# Patient Record
Sex: FEMALE
[De-identification: names, ages, dates, MRNs, and addresses within clinical notes are randomized per-mention and may not be internally consistent; named-entity substitution may affect disease eponyms.]

---

## 2023-03-22 ENCOUNTER — NURSE TRIAGE (OUTPATIENT)
Dept: OTHER | Facility: CLINIC | Age: 63
End: 2023-03-22

## 2023-03-22 NOTE — TELEPHONE ENCOUNTER
Location of patient: [place]    Subjective: Caller states \"right knee injury\"     Current Symptoms: right knee injury fell while cleaning slipped on floor now only hurts with movement swelling is getting better no numbness or tingling, no medical hx states is getting better but still sl painful    Onset: 4 days    Associated Symptoms: NA    Pain Severity: 6/10    Temperature: none       What has been tried: ice rested it     LMP: NA Pregnant: NA    Recommended disposition: See in Office Today or Tomorrow    Care advice provided, patient verbalizes understanding; denies any other questions or concerns; instructed to call back for any new or worsening symptoms. Patient/caller agrees to follow-up with PCP     This triage is a result of a call to [address] [place] [address]. Please do not respond to the triage nurse through this encounter. Any subsequent communication should be directly with the patient.       Reason for Disposition   High-risk adult (e.g., age > 61 years, osteoporosis, chronic steroid use)    Protocols used: Knee Injury-ADULT-[place]

## 2023-08-22 ENCOUNTER — NURSE TRIAGE (OUTPATIENT)
Dept: OTHER | Facility: CLINIC | Age: 63
End: 2023-08-22

## 2023-08-22 NOTE — TELEPHONE ENCOUNTER
Location of patient: [place]    Subjective: Caller states \"I was in contact with someone last Friday who tested positive for COVID\"     Current Symptoms: None    Associated Symptoms: irritability     Recommended disposition: [facility] advice provided, patient verbalizes understanding; denies any other questions or concerns; instructed to call back for any new or worsening symptoms. Caller agrees to purchase an at-home COVID test or get tested at a clinic. This triage is a result of a call to [address] [place] [address]. Please do not respond to the triage nurse through this encounter. Any subsequent communication should be directly with the patient.       Reason for Disposition   [1] COVID-19 EXPOSURE within last 14 days AND [2] NO symptoms    Protocols used: Coronavirus (COVID-19) Exposure-ADULT-[facility]

## 2024-01-17 ENCOUNTER — OFFICE VISIT (OUTPATIENT)
Dept: OTOLARYNGOLOGY | Facility: CLINIC | Age: 64
End: 2024-01-17
Payer: COMMERCIAL

## 2024-01-17 VITALS — WEIGHT: 237 LBS

## 2024-01-17 DIAGNOSIS — J30.9 ALLERGIC RHINITIS, UNSPECIFIED SEASONALITY, UNSPECIFIED TRIGGER: Primary | ICD-10-CM

## 2024-01-17 DIAGNOSIS — H93.19 TINNITUS, UNSPECIFIED LATERALITY: ICD-10-CM

## 2024-01-17 PROCEDURE — 99203 OFFICE O/P NEW LOW 30 MIN: CPT | Performed by: GENERAL PRACTICE

## 2024-01-17 PROCEDURE — 1036F TOBACCO NON-USER: CPT | Performed by: GENERAL PRACTICE

## 2024-01-17 RX ORDER — CHOLECALCIFEROL (VITAMIN D3) 25 MCG
TABLET ORAL DAILY
COMMUNITY

## 2024-01-17 RX ORDER — VITAMIN B COMPLEX
1 CAPSULE ORAL DAILY
COMMUNITY
Start: 2023-12-27

## 2024-01-17 ASSESSMENT — PATIENT HEALTH QUESTIONNAIRE - PHQ9
SUM OF ALL RESPONSES TO PHQ9 QUESTIONS 1 AND 2: 0
1. LITTLE INTEREST OR PLEASURE IN DOING THINGS: NOT AT ALL
2. FEELING DOWN, DEPRESSED OR HOPELESS: NOT AT ALL

## 2024-01-18 RX ORDER — GUAIFENESIN, PSEUDOEPHEDRINE HYDROCHLORIDE 600; 60 MG/1; MG/1
1 TABLET, EXTENDED RELEASE ORAL EVERY 12 HOURS
Qty: 60 TABLET | Refills: 2 | Status: SHIPPED | OUTPATIENT
Start: 2024-01-18

## 2024-01-18 NOTE — PROGRESS NOTES
Otolaryngology - Head and Neck Surgery Outpatient New Patient Visit Note        Assessment/Plan   Problem List Items Addressed This Visit    None  Visit Diagnoses         Codes    Allergic rhinitis, unspecified seasonality, unspecified trigger    -  Primary J30.9    Tinnitus, unspecified laterality     H93.19    Relevant Orders    Referral to Audiology              64yoF with L>R tinnitus with some ETD symptoms and a history of noise exposure.  No otitis on exam.   Continue controls for rhinitis, consider trial of mucinexD to aid in clearance.    Will acquire audiogram.        Follow up:  -plan for follow up in clinic as needed and after completion of ordered studies    All of the above findings, impressions, treatment planning and follow up plans were discussed with the patient who indicated understanding.  the patient was instructed to contact or return to clinic sooner if symptoms/signs persist or worsen despite the above management.      Andreas Esposito MD  Otolaryngology - Head and Neck Surgery            History Of Present Illness  Tanvi Lopez is a 64 y.o. female presenting for concerns for L>R tinnitus and some ear fullness.   Minimal hearing loss.   Tinnitus present for weeks/months    The patient reports slow progression of the hearing loss over time.  The paitent reports a history of intermittent, waxing/waning, nonpulsatile, tonal tinnitus which does not interfere with hearing.   The patient reports a history of   noise exposure due to occupational exposures, industrial noise, etc.     The patient denies sudden changes in hearing.  Notes waxing/waning ear fullness b/l.    The patient denies otalgia, otorrhea, vertigo, or facial weakness.    The patient denies a history of otologic surgery or trauma.  The patient denies a history of blast injury, TBI or concussion associated with hearing loss.  The patient denies a family history of significant hearing loss.  The patient reports a history of AOM as a child,  but no recent significant history of ear infection.         Reports a history of allergic rhinitis  Reports flonase and loratidine trial recently with mild effect.        Past Medical History  She has no past medical history on file.    Surgical History  She has no past surgical history on file.     Social History  She reports that she has never smoked. She has never used smokeless tobacco. No history on file for alcohol use and drug use.    Family History  No family history on file.     Allergies  Penicillins and Sulfa (sulfonamide antibiotics)    Review of Systems  ROS: Pertinent positives as noted in HPI.    - CONSTITUTIONAL: Does not report weight loss, fever or chills.    - HEENT:   Ear: Does not report  , vertigo, hearing loss, otalgia, otorrhea  Nose: Does not report  , rhinorrhea, epistaxis, decreased smell  Throat: Does not report pain, dysphagia, odynophagia  Larynx: Does not report hoarseness,  difficulty breathing, pain with speaking (odynophonia)  Neck: Does not report new masses, pain, swelling  Face: Does not report sinus pain, pressure, swelling, numbness, weakness     - RESPIRATORY: Does not report SOB or cough.    - CV: Does not report palpitations or chest pain.     - GI: Does not report abdominal pain, nausea, vomiting or diarrhea.    - : Does not report dysuria or urinary frequency.    - MSK: Does not report myalgia or joint pain.    - SKIN: Does not report rash or pruritus.    - NEUROLOGICAL: Does not report headache or syncope.    - PSYCHIATRIC: Does not report recent changes in mood. Does not report anxiety or depression.         Physical Exam:     GENERAL:   Alert & Oriented to person, place and time; Normal affect and appearance. Well developed and well nourished. Conversant & cooperative with examination.     HEAD:   Normocephalic, atraumatic. No sinus tenderness to palpation. Normal parotid bilaterally. Normal facial strength.     NEUROLOGIC:   Cranial nerves II-XII grossly intact, gait  WNL. Normal mood and affect.    EYES:   Extraocular movements intact. Pupils equal, round, reactive to light and accommodation. No nystagmus, no ptosis. no scleral injection.    EAR:   Normal auricle. No discomfort or TTP with manipulation.   Handheld otoscopic exam showed normal external auditory canals bilaterally. No purulence or EAC inflammation. Minimal cerumen.   Right tympanic membrane clear and mobile without evidence of perforation, retraction or middle ear effusion.   Left tympanic membrane clear and mobile without evidence of perforation, retraction or middle ear effusion.     NOSE:   No external deformity. No external nasal lesions, lacerations, or scars. Nasal tip symmetrical with normal nasal valves.   Nasal cavity with essentially midline septum, edematous  mucosa and turbinates. No lesions, masses, purulence or polyps.     OC/OP:   Mucous membranes moist, no masses, lesions or exudates.   Normal tongue, floor of mouth, teeth, gums, lips. Normal posterior pharyngeal wall.    Normal tonsils without erythema, exudate or obvious calculi     NECK:   No neck masses or thyroid enlargement. Trachea midline. No tenderness to palpation    LYMPHATIC:   No cervical lymphadenopathy.     RESPIRATORY:   Symmetric chest elevation & no retractions. No significant hoarseness. No increased work of breathing.    CV:   No clubbing or cyanosis. No obvious edema    Skin:   No facial rashes, vesicles or lesions.     Extremities:   No gross abnormalities      Clinic Procedure        Information review:  External sources (notes, imaging, lab results) listed below personally reviewed to aid in medical decision making process.  -  -  -